# Patient Record
Sex: FEMALE | Race: WHITE | NOT HISPANIC OR LATINO | Employment: OTHER | URBAN - METROPOLITAN AREA
[De-identification: names, ages, dates, MRNs, and addresses within clinical notes are randomized per-mention and may not be internally consistent; named-entity substitution may affect disease eponyms.]

---

## 2023-09-09 PROBLEM — H25.10 NUCLEAR SENILE CATARACT: Status: ACTIVE | Noted: 2023-09-09

## 2023-09-09 PROBLEM — I48.91 RAPID ATRIAL FIBRILLATION (MULTI): Status: ACTIVE | Noted: 2023-09-09

## 2023-09-09 PROBLEM — H35.3290 EXUDATIVE AGE-RELATED MACULAR DEGENERATION (MULTI): Status: ACTIVE | Noted: 2023-09-09

## 2023-09-09 PROBLEM — H35.3190 NONEXUDATIVE AGE-RELATED MACULAR DEGENERATION: Status: ACTIVE | Noted: 2023-09-09

## 2023-09-09 PROBLEM — N39.0 ACUTE URINARY TRACT INFECTION: Status: ACTIVE | Noted: 2023-09-09

## 2023-09-09 PROBLEM — H04.129 TEAR FILM INSUFFICIENCY: Status: ACTIVE | Noted: 2023-09-09

## 2023-09-09 RX ORDER — CHOLECALCIFEROL (VITAMIN D3) 25 MCG
25 TABLET ORAL
COMMUNITY

## 2023-09-09 RX ORDER — OMEPRAZOLE 20 MG/1
20 CAPSULE, DELAYED RELEASE ORAL
COMMUNITY

## 2023-09-09 RX ORDER — FLECAINIDE ACETATE 50 MG/1
50 TABLET ORAL
COMMUNITY

## 2023-09-09 RX ORDER — CEPHALEXIN 500 MG/1
500 CAPSULE ORAL 3 TIMES DAILY
COMMUNITY

## 2024-03-18 ENCOUNTER — CLINICAL SUPPORT (OUTPATIENT)
Dept: OPHTHALMOLOGY | Facility: CLINIC | Age: 79
End: 2024-03-18
Payer: MEDICARE

## 2024-03-18 ENCOUNTER — OFFICE VISIT (OUTPATIENT)
Dept: OPHTHALMOLOGY | Facility: CLINIC | Age: 79
End: 2024-03-18
Payer: MEDICARE

## 2024-03-18 ENCOUNTER — APPOINTMENT (OUTPATIENT)
Dept: OPHTHALMOLOGY | Facility: CLINIC | Age: 79
End: 2024-03-18
Payer: MEDICARE

## 2024-03-18 DIAGNOSIS — H25.13 NUCLEAR SENILE CATARACT OF BOTH EYES: ICD-10-CM

## 2024-03-18 DIAGNOSIS — H35.3121 NONEXUDATIVE AGE-RELATED MACULAR DEGENERATION, LEFT EYE, EARLY DRY STAGE: ICD-10-CM

## 2024-03-18 DIAGNOSIS — H35.3212 EXUDATIVE AGE-RELATED MACULAR DEGENERATION OF RIGHT EYE WITH INACTIVE CHOROIDAL NEOVASCULARIZATION (MULTI): Primary | ICD-10-CM

## 2024-03-18 DIAGNOSIS — H52.7 REFRACTION ERROR: ICD-10-CM

## 2024-03-18 PROCEDURE — 92015 DETERMINE REFRACTIVE STATE: CPT | Performed by: OPHTHALMOLOGY

## 2024-03-18 PROCEDURE — 99214 OFFICE O/P EST MOD 30 MIN: CPT | Performed by: OPHTHALMOLOGY

## 2024-03-18 RX ORDER — OSELTAMIVIR PHOSPHATE 30 MG/1
30 CAPSULE ORAL
COMMUNITY
Start: 2024-03-08 | End: 2024-03-18

## 2024-03-18 RX ORDER — DOCUSATE SODIUM 100 MG/1
100 CAPSULE, LIQUID FILLED ORAL 2 TIMES DAILY
COMMUNITY
Start: 2024-03-11 | End: 2024-03-25

## 2024-03-18 RX ORDER — METHOCARBAMOL 750 MG/1
750 TABLET, FILM COATED ORAL 3 TIMES DAILY
COMMUNITY
Start: 2024-03-11 | End: 2024-03-21

## 2024-03-18 RX ORDER — CLONAZEPAM 1 MG/1
1 TABLET ORAL 2 TIMES DAILY PRN
COMMUNITY
Start: 2023-09-01

## 2024-03-18 RX ORDER — ESCITALOPRAM OXALATE 10 MG/1
10 TABLET ORAL DAILY
COMMUNITY
Start: 2023-04-07

## 2024-03-18 RX ORDER — METOPROLOL TARTRATE 25 MG/1
25 TABLET, FILM COATED ORAL 2 TIMES DAILY
COMMUNITY
Start: 2023-08-11

## 2024-03-18 RX ORDER — ASCORBIC ACID 500 MG
500 TABLET ORAL
COMMUNITY
Start: 2024-03-11 | End: 2024-04-30

## 2024-03-18 RX ORDER — ACETAMINOPHEN 500 MG
1000 TABLET ORAL EVERY 8 HOURS PRN
COMMUNITY
Start: 2024-03-11 | End: 2024-03-25

## 2024-03-18 RX ORDER — TRAMADOL HYDROCHLORIDE 50 MG/1
50 TABLET ORAL EVERY 4 HOURS PRN
COMMUNITY
Start: 2024-03-11 | End: 2024-03-19

## 2024-03-18 RX ORDER — BUSPIRONE HYDROCHLORIDE 10 MG/1
10 TABLET ORAL 3 TIMES DAILY
COMMUNITY
Start: 2023-12-05

## 2024-03-18 RX ORDER — OXYCODONE HYDROCHLORIDE 5 MG/1
5 TABLET ORAL EVERY 4 HOURS PRN
COMMUNITY
Start: 2024-03-11 | End: 2024-04-10

## 2024-03-18 RX ORDER — MECLIZINE HYDROCHLORIDE 25 MG/1
TABLET ORAL
COMMUNITY
Start: 2023-08-19

## 2024-03-18 RX ORDER — ERGOCALCIFEROL 1.25 MG/1
100000 CAPSULE ORAL WEEKLY
COMMUNITY
Start: 2024-03-11 | End: 2024-05-28

## 2024-03-18 ASSESSMENT — KERATOMETRY
OD_AXISANGLE2_DEGREES: 180
OS_K1POWER_DIOPTERS: 44.75
OD_K2POWER_DIOPTERS: 44.75
OS_AXISANGLE_DEGREES: 120
OD_AXISANGLE_DEGREES: 90
OD_K1POWER_DIOPTERS: 44.75
OS_K2POWER_DIOPTERS: 45.00
OS_AXISANGLE2_DEGREES: 30

## 2024-03-18 ASSESSMENT — ENCOUNTER SYMPTOMS
NEUROLOGICAL NEGATIVE: 0
EYES NEGATIVE: 0
ENDOCRINE NEGATIVE: 0
OCCASIONAL FEELINGS OF UNSTEADINESS: 0
GASTROINTESTINAL NEGATIVE: 0
RESPIRATORY NEGATIVE: 0
DEPRESSION: 0
CONSTITUTIONAL NEGATIVE: 0
LOSS OF SENSATION IN FEET: 0
ALLERGIC/IMMUNOLOGIC NEGATIVE: 0
HEMATOLOGIC/LYMPHATIC NEGATIVE: 0
PSYCHIATRIC NEGATIVE: 0
CARDIOVASCULAR NEGATIVE: 0
MUSCULOSKELETAL NEGATIVE: 1

## 2024-03-18 ASSESSMENT — REFRACTION_WEARINGRX
OD_ADD: +2.75
OD_SPHERE: +1.25
OD_CYLINDER: -0.50
OS_SPHERE: +1.00
OD_AXIS: 078

## 2024-03-18 ASSESSMENT — VISUAL ACUITY
OS_CC: 20/40
OD_CC: 20/25
CORRECTION_TYPE: GLASSES
METHOD: SNELLEN - LINEAR
OD_CC+: -2
OS_CC+: +1

## 2024-03-18 ASSESSMENT — EXTERNAL EXAM - LEFT EYE: OS_EXAM: NORMAL

## 2024-03-18 ASSESSMENT — SLIT LAMP EXAM - LIDS
COMMENTS: NORMAL
COMMENTS: NORMAL

## 2024-03-18 ASSESSMENT — CUP TO DISC RATIO
OS_RATIO: 0.3
OD_RATIO: 0.3

## 2024-03-18 ASSESSMENT — PAIN SCALES - GENERAL: PAINLEVEL: 0-NO PAIN

## 2024-03-18 ASSESSMENT — TONOMETRY
OD_IOP_MMHG: 18
OS_IOP_MMHG: 18
IOP_METHOD: GOLDMANN APPLANATION

## 2024-03-18 ASSESSMENT — EXTERNAL EXAM - RIGHT EYE: OD_EXAM: NORMAL

## 2024-03-18 ASSESSMENT — PATIENT HEALTH QUESTIONNAIRE - PHQ9
SUM OF ALL RESPONSES TO PHQ9 QUESTIONS 1 AND 2: 0
1. LITTLE INTEREST OR PLEASURE IN DOING THINGS: NOT AT ALL
2. FEELING DOWN, DEPRESSED OR HOPELESS: NOT AT ALL

## 2024-03-18 NOTE — PROGRESS NOTES
Assessment/Plan   Problem List Items Addressed This Visit       Nuclear senile cataract     Non significant cataract noted on exam. Will plan to continue to monitor with serial exam.            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 Due to see retina in few weeks for planned injection. Will continue to monitor with serial exam. Continue close follow up with retina provider.          Nonexudative age-related macular degeneration, left eye, early dry stage     Stable atrophic changes left eye (OS) and right eye (OD). Is considering newer injectable treatments, due to see retina provider in few weeks. Understands to call if any new changes in vision.          Refraction error     Discussed glasses prescription from refraction. Will provide if patient interested in keeping for records or to fill as a new set of glasses.               Provided reassurance regarding above diagnoses and care received in the office visit today. Discussed outcomes and options along with the importance of treatment compliance. Understands the importance of any follow up visits. Patient instructed to call/communicate with our office if any new issues, questions, or concerns.     Will plan to see back in 1 year full or sooner PRN

## 2024-03-18 NOTE — PATIENT INSTRUCTIONS
Thank you so much for choosing me to provide your care today!    If you were dilated your vision may remain blurry   or light sensitive for several hours.    The nature of eye and vision problems can require frequent follow up, please make every effort to adhere to any future appointments.    If you have any issues, questions, or concerns,   please do not hesitate to reach out.    If you receive a survey in regards to your care today, please mention any exceptional care my office staff and/or technicians provided.    You can reach our office at this number:  [phone]

## 2024-03-18 NOTE — ASSESSMENT & PLAN NOTE
No obvious active exudation today. Due to see retina in few weeks for planned injection. Will continue to monitor with serial exam. Continue close follow up with retina provider.

## 2024-03-18 NOTE — ASSESSMENT & PLAN NOTE
Stable atrophic changes left eye (OS) and right eye (OD). Is considering newer injectable treatments, due to see retina provider in few weeks. Understands to call if any new changes in vision.

## 2025-03-27 ENCOUNTER — APPOINTMENT (OUTPATIENT)
Dept: OPHTHALMOLOGY | Facility: CLINIC | Age: 80
End: 2025-03-27
Payer: MEDICARE

## 2025-04-10 ENCOUNTER — APPOINTMENT (OUTPATIENT)
Dept: OPHTHALMOLOGY | Facility: CLINIC | Age: 80
End: 2025-04-10
Payer: MEDICARE

## 2025-04-10 DIAGNOSIS — H04.123 INSUFFICIENCY OF TEAR FILM OF BOTH EYES: ICD-10-CM

## 2025-04-10 DIAGNOSIS — H52.7 REFRACTION ERROR: ICD-10-CM

## 2025-04-10 DIAGNOSIS — H35.3133 ADVANCED ATROPHIC NONEXUDATIVE AGE-RELATED MACULAR DEGENERATION OF BOTH EYES WITHOUT SUBFOVEAL INVOLVEMENT: Primary | ICD-10-CM

## 2025-04-10 DIAGNOSIS — H35.3232 EXUDATIVE AGE-RELATED MACULAR DEGENERATION OF BOTH EYES WITH INACTIVE CHOROIDAL NEOVASCULARIZATION: ICD-10-CM

## 2025-04-10 DIAGNOSIS — H25.13 NUCLEAR SENILE CATARACT OF BOTH EYES: ICD-10-CM

## 2025-04-10 PROBLEM — H35.3123 ADVANCED ATROPHIC NONEXUDATIVE AGE-RELATED MACULAR DEGENERATION OF LEFT EYE WITHOUT SUBFOVEAL INVOLVEMENT: Status: ACTIVE | Noted: 2024-03-18

## 2025-04-10 PROCEDURE — 92015 DETERMINE REFRACTIVE STATE: CPT | Performed by: OPHTHALMOLOGY

## 2025-04-10 PROCEDURE — 99214 OFFICE O/P EST MOD 30 MIN: CPT | Performed by: OPHTHALMOLOGY

## 2025-04-10 RX ORDER — FLECAINIDE ACETATE 100 MG/1
1 TABLET ORAL
COMMUNITY
Start: 2024-12-28

## 2025-04-10 ASSESSMENT — KERATOMETRY
OD_AXISANGLE_DEGREES: 90
OD_AXISANGLE2_DEGREES: 180
OD_K1POWER_DIOPTERS: 45.00
OD_K2POWER_DIOPTERS: 45.00
OS_K2POWER_DIOPTERS: 45.50
OS_AXISANGLE_DEGREES: 115
OS_AXISANGLE2_DEGREES: 25
OS_K1POWER_DIOPTERS: 45.00

## 2025-04-10 ASSESSMENT — VISUAL ACUITY
OD_CC: 20/40
METHOD: SNELLEN - LINEAR
CORRECTION_TYPE: GLASSES
OS_CC: 20/40
OD_CC+: +1

## 2025-04-10 ASSESSMENT — TONOMETRY
OS_IOP_MMHG: 16
IOP_METHOD: GOLDMANN APPLANATION
OD_IOP_MMHG: 16

## 2025-04-10 ASSESSMENT — REFRACTION_MANIFEST
OS_SPHERE: +1.00
OS_CYLINDER: -0.50
METHOD_AUTOREFRACTION: 1
OD_SPHERE: +1.25
OS_SPHERE: +1.00
OD_AXIS: 090
OD_AXIS: 080
OD_SPHERE: +1.25
OD_ADD: +2.75
OD_CYLINDER: -0.50
OS_ADD: +2.75
OD_CYLINDER: -1.00
OS_AXIS: 120

## 2025-04-10 ASSESSMENT — ENCOUNTER SYMPTOMS
EYES NEGATIVE: 0
NEUROLOGICAL NEGATIVE: 0
GASTROINTESTINAL NEGATIVE: 0
MUSCULOSKELETAL NEGATIVE: 0
HEMATOLOGIC/LYMPHATIC NEGATIVE: 0
ALLERGIC/IMMUNOLOGIC NEGATIVE: 0
ENDOCRINE NEGATIVE: 0
PSYCHIATRIC NEGATIVE: 0
RESPIRATORY NEGATIVE: 0
CONSTITUTIONAL NEGATIVE: 0
CARDIOVASCULAR NEGATIVE: 0

## 2025-04-10 ASSESSMENT — REFRACTION_WEARINGRX
OD_AXIS: 082
OD_SPHERE: +1.25
OS_ADD: +2.75
OD_ADD: +2.75
OS_SPHERE: +1.00
OD_CYLINDER: -0.50
SPECS_TYPE: PAL

## 2025-04-10 ASSESSMENT — CUP TO DISC RATIO
OS_RATIO: 0.3
OD_RATIO: 0.3

## 2025-04-10 ASSESSMENT — EXTERNAL EXAM - RIGHT EYE: OD_EXAM: NORMAL

## 2025-04-10 ASSESSMENT — SLIT LAMP EXAM - LIDS: COMMENTS: NORMAL

## 2025-04-10 ASSESSMENT — PAIN SCALES - GENERAL: PAINLEVEL_OUTOF10: 0-NO PAIN

## 2025-04-10 ASSESSMENT — EXTERNAL EXAM - LEFT EYE: OS_EXAM: NORMAL

## 2025-04-10 NOTE — ASSESSMENT & PLAN NOTE
New Rx for glasses/SCL given per patient request. Patient's signature obtained to acknowledge and confirm that a paper copy of glasses/SCL Rx was given to patient in compliance with [facility] Eyeglass Rule. Electronic copy of Rx will also be available via [employer].

## 2025-04-10 NOTE — PROGRESS NOTES
Assessment/Plan   Problem List Items Addressed This Visit       Tear film insufficiency     Advised on role of regular lubrication for best ocular comfort and vision.          Nuclear senile cataract     Non significant cataract noted on exam. Discussed the natural course of cataract and may require surgery at some point in the future. Will plan to continue to monitor with serial exam.            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 Advised to continue best lifestyle modification. Understands to call if any worsening changes.          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          Refraction error     New Rx for glasses/SCL given per patient request. Patient's signature obtained to acknowledge and confirm that a paper copy of glasses/SCL Rx was given to patient in compliance with [facility] Eyeglass Rule. Electronic copy of Rx will also be available via [employer].               Provided reassurance regarding above diagnoses and care received in the office visit today. Discussed outcomes and options along with the importance of treatment compliance. Understands the importance of any follow up visits. Patient instructed to call/communicate with our office if any new issues, questions, or concerns.     Will plan to see back in 1 year full or sooner PRN

## 2025-04-10 NOTE — ASSESSMENT & PLAN NOTE
Following closely with retina and getting injections for both wet and atrophic changes both eyes (OU). Advised to continue best lifestyle modification. Understands to call if any worsening changes.

## 2025-04-10 NOTE — PATIENT INSTRUCTIONS
Thank you so much for choosing me to provide your care today!    If you were dilated your vision may remain blurry   or light sensitive for several hours.    The nature of eye and vision problems can require frequent follow up, please make every effort to adhere to any future appointments.    If you have any issues, questions, or concerns,   please do not hesitate to reach out.    If you receive a survey in regards to your care today, please mention any exceptional care my office staff and/or technicians provided.    You can reach our office at this number:    [phone]    Please consider signing up for and utilizing [facility]!  This is the best way to directly reach me or other  providers

## 2025-04-28 ENCOUNTER — TELEPHONE (OUTPATIENT)
Dept: OPHTHALMOLOGY | Facility: CLINIC | Age: 80
End: 2025-04-28
Payer: MEDICARE

## 2025-04-28 NOTE — TELEPHONE ENCOUNTER
Still noting irritation and some slight redness, didn't respond much to lubrication. Heat may help some. Was told by derm in past there was dermatitis and could consider steroid cream OTC. OK with using low dose short term but if not responding would likely need topical steroid or allergy drop trial.

## 2026-04-13 ENCOUNTER — APPOINTMENT (OUTPATIENT)
Dept: OPHTHALMOLOGY | Facility: CLINIC | Age: 81
End: 2026-04-13
Payer: MEDICARE